# Patient Record
Sex: FEMALE | Race: WHITE | Employment: FULL TIME | URBAN - METROPOLITAN AREA
[De-identification: names, ages, dates, MRNs, and addresses within clinical notes are randomized per-mention and may not be internally consistent; named-entity substitution may affect disease eponyms.]

---

## 2018-08-21 PROBLEM — Z34.81 PRENATAL CARE, SUBSEQUENT PREGNANCY IN FIRST TRIMESTER: Status: ACTIVE | Noted: 2018-08-21

## 2018-08-21 PROBLEM — A63.0 VULVOVAGINAL CONDYLOMA: Status: ACTIVE | Noted: 2018-08-21

## 2018-08-21 PROCEDURE — 88142 CYTOPATH C/V THIN LAYER: CPT

## 2018-08-22 ENCOUNTER — HOSPITAL ENCOUNTER (OUTPATIENT)
Dept: LAB | Age: 28
Discharge: HOME | End: 2018-08-22

## 2018-10-15 PROBLEM — O26.899 PREGNANCY HEADACHE: Status: ACTIVE | Noted: 2018-10-15

## 2018-10-15 PROBLEM — R51.9 PREGNANCY HEADACHE: Status: ACTIVE | Noted: 2018-10-15

## 2019-02-18 ENCOUNTER — ANESTHESIA EVENT (OUTPATIENT)
Dept: LABOR AND DELIVERY | Age: 29
DRG: 560 | End: 2019-02-18
Payer: COMMERCIAL

## 2019-02-18 ENCOUNTER — ANESTHESIA (OUTPATIENT)
Dept: LABOR AND DELIVERY | Age: 29
DRG: 560 | End: 2019-02-18
Payer: COMMERCIAL

## 2019-02-18 ENCOUNTER — HOSPITAL ENCOUNTER (INPATIENT)
Age: 29
LOS: 2 days | Discharge: HOME | DRG: 560 | End: 2019-02-20
Attending: OBSTETRICS & GYNECOLOGY | Admitting: OBSTETRICS & GYNECOLOGY
Payer: COMMERCIAL

## 2019-02-18 PROBLEM — O42.90 DELAYED DELIVERY AFTER SROM (SPONTANEOUS RUPTURE OF MEMBRANES)ANTEPART: Status: RESOLVED | Noted: 2019-02-18 | Resolved: 2019-02-18

## 2019-02-18 PROBLEM — Z37.9 NORMAL LABOR: Status: ACTIVE | Noted: 2019-02-18

## 2019-02-18 PROBLEM — O42.90 AMNIOTIC FLUID LEAKING: Status: ACTIVE | Noted: 2019-02-18

## 2019-02-18 PROBLEM — O42.90 DELAYED DELIVERY AFTER SROM (SPONTANEOUS RUPTURE OF MEMBRANES)ANTEPART: Status: ACTIVE | Noted: 2019-02-18

## 2019-02-18 LAB
ABO + RH BLD: NORMAL
ARTERIAL PATENCY WRIST A: ABNORMAL
ARTERIAL PATENCY WRIST A: ABNORMAL
BASE DEFICIT BLD-SCNC: 3 MMOL/L
BASE DEFICIT BLD-SCNC: 4 MMOL/L
BDY SITE: ABNORMAL
BDY SITE: ABNORMAL
BLOOD GROUP ANTIBODIES SERPL: NORMAL
BODY TEMPERATURE: 98.6
BODY TEMPERATURE: 98.6
CO2 BLD-SCNC: 23 MMOL/L
CO2 BLD-SCNC: 24 MMOL/L
COLLECT TIME,HTIME: 2228
COLLECT TIME,HTIME: 2228
ERYTHROCYTE [DISTWIDTH] IN BLOOD BY AUTOMATED COUNT: 13.2 % (ref 11.9–14.6)
GAS FLOW.O2 O2 DELIVERY SYS: ABNORMAL L/MIN
GAS FLOW.O2 O2 DELIVERY SYS: ABNORMAL L/MIN
HCO3 BLD-SCNC: 21.8 MMOL/L (ref 22–26)
HCO3 BLDV-SCNC: 22.5 MMOL/L (ref 23–28)
HCT VFR BLD AUTO: 34 % (ref 35.8–46.3)
HGB BLD-MCNC: 11.3 G/DL (ref 11.7–15.4)
MCH RBC QN AUTO: 29.3 PG (ref 26.1–32.9)
MCHC RBC AUTO-ENTMCNC: 33.2 G/DL (ref 31.4–35)
MCV RBC AUTO: 88.1 FL (ref 79.6–97.8)
NRBC # BLD: 0 K/UL (ref 0–0.2)
PCO2 BLDCO: 39 MMHG (ref 32–68)
PCO2 BLDCO: 42 MMHG (ref 32–68)
PH BLDCO: 7.32 [PH] (ref 7.15–7.38)
PH BLDCO: 7.37 [PH] (ref 7.15–7.38)
PLATELET # BLD AUTO: 206 K/UL (ref 150–450)
PMV BLD AUTO: 9.7 FL (ref 9.4–12.3)
PO2 BLDCO: 28 MMHG
PO2 BLDCO: 31 MMHG
RBC # BLD AUTO: 3.86 M/UL (ref 4.05–5.2)
SAO2 % BLD: 47 % (ref 95–98)
SAO2 % BLDV: 57 % (ref 65–88)
SERVICE CMNT-IMP: ABNORMAL
SERVICE CMNT-IMP: ABNORMAL
SPECIMEN EXP DATE BLD: NORMAL
SPECIMEN TYPE: ABNORMAL
SPECIMEN TYPE: ABNORMAL
WBC # BLD AUTO: 7.4 K/UL (ref 4.3–11.1)

## 2019-02-18 PROCEDURE — 74011250636 HC RX REV CODE- 250/636

## 2019-02-18 PROCEDURE — 77030011943

## 2019-02-18 PROCEDURE — 74011250636 HC RX REV CODE- 250/636: Performed by: OBSTETRICS & GYNECOLOGY

## 2019-02-18 PROCEDURE — 77030020255 HC SOL INJ LR 1000ML BG

## 2019-02-18 PROCEDURE — A4300 CATH IMPL VASC ACCESS PORTAL: HCPCS | Performed by: ANESTHESIOLOGY

## 2019-02-18 PROCEDURE — 65270000029 HC RM PRIVATE

## 2019-02-18 PROCEDURE — 86900 BLOOD TYPING SEROLOGIC ABO: CPT

## 2019-02-18 PROCEDURE — 4A1HXCZ MONITORING OF PRODUCTS OF CONCEPTION, CARDIAC RATE, EXTERNAL APPROACH: ICD-10-PCS | Performed by: OBSTETRICS & GYNECOLOGY

## 2019-02-18 PROCEDURE — 82803 BLOOD GASES ANY COMBINATION: CPT

## 2019-02-18 PROCEDURE — 10907ZC DRAINAGE OF AMNIOTIC FLUID, THERAPEUTIC FROM PRODUCTS OF CONCEPTION, VIA NATURAL OR ARTIFICIAL OPENING: ICD-10-PCS | Performed by: OBSTETRICS & GYNECOLOGY

## 2019-02-18 PROCEDURE — 77030014125 HC TY EPDRL BBMI -B: Performed by: ANESTHESIOLOGY

## 2019-02-18 PROCEDURE — 85027 COMPLETE CBC AUTOMATED: CPT

## 2019-02-18 PROCEDURE — 77030020254 HC SOL INJ D5LR LACTATED RINGER

## 2019-02-18 RX ORDER — OXYTOCIN/RINGER'S LACTATE 30/500 ML
1-25 PLASTIC BAG, INJECTION (ML) INTRAVENOUS
Status: DISCONTINUED | OUTPATIENT
Start: 2019-02-18 | End: 2019-02-20 | Stop reason: ALTCHOICE

## 2019-02-18 RX ORDER — LIDOCAINE HYDROCHLORIDE 20 MG/ML
JELLY TOPICAL
Status: DISCONTINUED | OUTPATIENT
Start: 2019-02-18 | End: 2019-02-19 | Stop reason: HOSPADM

## 2019-02-18 RX ORDER — BUTORPHANOL TARTRATE 2 MG/ML
1 INJECTION INTRAMUSCULAR; INTRAVENOUS
Status: DISCONTINUED | OUTPATIENT
Start: 2019-02-18 | End: 2019-02-19 | Stop reason: HOSPADM

## 2019-02-18 RX ORDER — ROPIVACAINE HYDROCHLORIDE 2 MG/ML
INJECTION, SOLUTION EPIDURAL; INFILTRATION; PERINEURAL
Status: DISCONTINUED | OUTPATIENT
Start: 2019-02-18 | End: 2019-02-18 | Stop reason: HOSPADM

## 2019-02-18 RX ORDER — ROPIVACAINE HYDROCHLORIDE 2 MG/ML
INJECTION, SOLUTION EPIDURAL; INFILTRATION; PERINEURAL AS NEEDED
Status: DISCONTINUED | OUTPATIENT
Start: 2019-02-18 | End: 2019-02-18 | Stop reason: HOSPADM

## 2019-02-18 RX ORDER — DEXTROSE, SODIUM CHLORIDE, SODIUM LACTATE, POTASSIUM CHLORIDE, AND CALCIUM CHLORIDE 5; .6; .31; .03; .02 G/100ML; G/100ML; G/100ML; G/100ML; G/100ML
125 INJECTION, SOLUTION INTRAVENOUS CONTINUOUS
Status: DISCONTINUED | OUTPATIENT
Start: 2019-02-18 | End: 2019-02-20 | Stop reason: ALTCHOICE

## 2019-02-18 RX ORDER — FENTANYL CITRATE 50 UG/ML
INJECTION, SOLUTION INTRAMUSCULAR; INTRAVENOUS
Status: COMPLETED
Start: 2019-02-18 | End: 2019-02-18

## 2019-02-18 RX ORDER — LIDOCAINE HYDROCHLORIDE 10 MG/ML
1 INJECTION INFILTRATION; PERINEURAL
Status: DISCONTINUED | OUTPATIENT
Start: 2019-02-18 | End: 2019-02-19 | Stop reason: HOSPADM

## 2019-02-18 RX ORDER — SODIUM CHLORIDE 0.9 % (FLUSH) 0.9 %
5-40 SYRINGE (ML) INJECTION AS NEEDED
Status: DISCONTINUED | OUTPATIENT
Start: 2019-02-18 | End: 2019-02-20 | Stop reason: HOSPADM

## 2019-02-18 RX ORDER — MINERAL OIL
120 OIL (ML) ORAL
Status: DISCONTINUED | OUTPATIENT
Start: 2019-02-18 | End: 2019-02-19 | Stop reason: HOSPADM

## 2019-02-18 RX ORDER — SODIUM CHLORIDE 0.9 % (FLUSH) 0.9 %
5-40 SYRINGE (ML) INJECTION EVERY 8 HOURS
Status: DISCONTINUED | OUTPATIENT
Start: 2019-02-18 | End: 2019-02-20 | Stop reason: ALTCHOICE

## 2019-02-18 RX ORDER — FENTANYL CITRATE 50 UG/ML
INJECTION, SOLUTION INTRAMUSCULAR; INTRAVENOUS AS NEEDED
Status: DISCONTINUED | OUTPATIENT
Start: 2019-02-18 | End: 2019-02-18 | Stop reason: HOSPADM

## 2019-02-18 RX ADMIN — ROPIVACAINE HYDROCHLORIDE 10 ML/HR: 2 INJECTION, SOLUTION EPIDURAL; INFILTRATION; PERINEURAL at 15:06

## 2019-02-18 RX ADMIN — FENTANYL CITRATE 100 MCG: 50 INJECTION, SOLUTION INTRAMUSCULAR; INTRAVENOUS at 15:05

## 2019-02-18 RX ADMIN — SODIUM CHLORIDE, SODIUM LACTATE, POTASSIUM CHLORIDE, CALCIUM CHLORIDE, AND DEXTROSE MONOHYDRATE 125 ML/HR: 600; 310; 30; 20; 5 INJECTION, SOLUTION INTRAVENOUS at 11:48

## 2019-02-18 RX ADMIN — ROPIVACAINE HYDROCHLORIDE 8 ML: 2 INJECTION, SOLUTION EPIDURAL; INFILTRATION; PERINEURAL at 15:05

## 2019-02-18 RX ADMIN — SODIUM CHLORIDE, SODIUM LACTATE, POTASSIUM CHLORIDE, CALCIUM CHLORIDE, AND DEXTROSE MONOHYDRATE 125 ML/HR: 600; 310; 30; 20; 5 INJECTION, SOLUTION INTRAVENOUS at 20:13

## 2019-02-18 RX ADMIN — Medication 2 MILLI-UNITS/MIN: at 12:18

## 2019-02-18 RX ADMIN — SODIUM CHLORIDE, SODIUM LACTATE, POTASSIUM CHLORIDE, AND CALCIUM CHLORIDE 500 ML: 600; 310; 30; 20 INJECTION, SOLUTION INTRAVENOUS at 14:29

## 2019-02-18 NOTE — ANESTHESIA PREPROCEDURE EVALUATION
Anesthetic History Review of Systems / Medical History Patient summary reviewed and pertinent labs reviewed Pulmonary Within defined limits Neuro/Psych Within defined limits Cardiovascular Exercise tolerance: >4 METS 
  
GI/Hepatic/Renal 
Within defined limits Endo/Other Within defined limits Other Findings Comments: Term pregnancy Physical Exam 
 
Airway Mallampati: II 
TM Distance: 4 - 6 cm Neck ROM: normal range of motion Mouth opening: Normal 
 
 Cardiovascular Rhythm: regular Rate: normal 
 
 
 
 Dental 
No notable dental hx Pulmonary Breath sounds clear to auscultation Abdominal 
 
 
 
 Other Findings Anesthetic Plan ASA: 2 Anesthesia type: epidural 
 
 
Post-op pain plan if not by surgeon: epidural opioid Anesthetic plan and risks discussed with: Patient

## 2019-02-18 NOTE — PROGRESS NOTES
Admission Note Pt admitted to [address] [facility] [address] Admission assessment completed. Discussed plan of care with patient. IV started, Consents witnessed. Lab work drawn, sent to lab. Pt informed of need to track I&O for entire stay, instructed on use of specipan. Reviewed \"pain goal\" with patient, explaining realistic expectations for pain relief.

## 2019-02-18 NOTE — PROGRESS NOTES
02/18/19 1542 Cervical Exam  
Dilation (cm) 1.5 Vaginal exam done by? Dr Shana Rivera Membrane Status AROM  
SVE per Dr Shana Rivera.

## 2019-02-18 NOTE — PROGRESS NOTES
Dr. Rosa Hopkins at bedside. SVE per Dr. Rosa Hopkins 1-2 cm. AROM without difficulty per. Dr. Rosa Hopkins. Clear fluids noted.

## 2019-02-18 NOTE — ANESTHESIA PROCEDURE NOTES
Epidural Block Start time: 2/18/2019 2:52 PM 
End time: 2/18/2019 3:01 PM 
Performed by: Alexandra Snider MD 
Authorized by: Alexandra Snider MD  
 
Pre-Procedure Indication: at surgeon's request, procedure for pain and labor epidural   
Preanesthetic Checklist: patient identified, risks and benefits discussed, anesthesia consent, site marked, patient being monitored, timeout performed and anesthesia consent Timeout Time: 14:50 Epidural:  
Patient position:  Seated Prep region:  Lumbar Prep: Chlorhexidine Location:  L2-3 Needle and Epidural Catheter:  
Needle Type:  Tuohy Needle Gauge:  19 G Injection Technique:  Loss of resistance using air and loss of resistance using saline Attempts:  2 (at same space) Catheter Size:  20 G Catheter at Skin Depth (cm):  9 Depth in Epidural Space (cm):  5 Events: no blood with aspiration, no cerebrospinal fluid with aspiration, no paresthesia and negative aspiration test   
Test Dose:  Lidocaine 1.5% w/ epi and negative Assessment:  
Catheter Secured:  Tegaderm and tape Insertion:  Uncomplicated Patient tolerance:  Patient tolerated the procedure well with no immediate complications

## 2019-02-18 NOTE — PROGRESS NOTES
Labor Progress Note Patient seen, fetal heart rate and contraction pattern evaluated, patient examined. Patient Vitals for the past 1 hrs: 
 BP Pulse 02/18/19 1505 153/68 88  
02/18/19 1504 133/77 88  
02/18/19 1503 135/86 93  
02/18/19 1500 136/88 100 Physical Exam: 
Cervical Exam:   
1-2cm dilated 70% effaced Membranes:  Artificial Rupture of Membranes; Amniotic Fluid: medium amount of clear fluid Uterine Activity: Frequency: Every 3-4 minutes and Intensity: mild Fetal Heart Rate: Reactive Assessment/Plan: 
Reassuring fetal status, Continue plan for vaginal delivery

## 2019-02-18 NOTE — H&P
History & Physical 
 
Name: Kenya Mata MRN: [id number]  SSN: [id number] YOB: 1990  Age: 33 y.o. Sex: female Subjective:  
 
Estimated Date of Delivery: 19 OB History  Para Term  AB Living 2 1 1     1 SAB TAB Ectopic Molar Multiple Live Births 1  
  
# Outcome Date GA Lbr Marcos/2nd Weight Sex Delivery Anes PTL Lv  
2 Current 1 Term 14 40w6d  6 lb 11.6 oz (3.05 kg) M VAGINAL DELI EPIDURAL AN N JAMA Ms. Giuliano Hernandez is admitted with pregnancy at 39w0d for induction of labor due to spontaneous rupture of membranes. Prenatal course was normal.  Please see prenatal records for details. Past Medical History:  
Diagnosis Date  Abnormal Papanicolaou smear of cervix  Depression 2016  Known medical problems Ulcers  Vaginal delivery  Vaginal Pap smear with LGSIL   
 HPV effect, Mild dysplasia, candida present. GC / CHL neg. History reviewed. No pertinent surgical history. Social History Occupational History  Not on file Tobacco Use  Smoking status: Never Smoker  Smokeless tobacco: Never Used Substance and Sexual Activity  Alcohol use: Yes  Drug use: No  
 Sexual activity: Yes  
  Partners: Male Birth control/protection: None, Condom Family History Problem Relation Age of Onset  Thyroid Disease Mother  Hypertension Mother  Diabetes Father   
     borderline  Hypertension Father  Psychiatric Disorder Brother   
     possibly mild downs  Breast Cancer Maternal Aunt   
     great aunt  Heart Disease Maternal Grandmother   
     heart attack  Alcohol abuse Maternal Grandfather  Heart Disease Paternal Grandmother  Stroke Other On father side  Heart Disease Other On mother sided No Known Allergies Prior to Admission medications Medication Sig Start Date End Date Taking? Authorizing Provider prenatal multivitamin (PRENATAL RX) 60 mg iron-1 mg tablet Take 1 tab daily 8/7/13  Yes CÉSAR Herndon  
polyethylene glycol (MIRALAX) 17 gram/dose powder Take 17 g by mouth daily. 1/22/19   Siena Huang MD  
magnesium oxide (MAG-OX) 400 mg tablet Take 1 Tab by mouth two (2) times a day. 1/8/19   Siena Huang MD  
  
 
Review of Systems: Pertinent items are noted in the History of Present Illness. Objective:  
 
Vitals: 
Vitals:  
 02/18/19 1140 02/18/19 1141 02/18/19 1215 02/18/19 1413 BP: 123/77 123/77 Pulse: 94 94 Temp:   98.3 °F (36.8 °C) 99.1 °F (37.3 °C) Weight:      
Height:      
  
 
Physical Exam: 
Patient without distress. Heart: Regular rate and rhythm Lung: clear to auscultation throughout lung fields, no wheezes, no rales, no rhonchi and normal respiratory effort Back: costovertebral angle tenderness absent Abdomen: soft, nontender Fundus: soft and non tender Perineum: blood absent, amniotic fluid present Cervical Exam: 1 cm dilated Lower Extremities:  - Edema 1+ 
 - No evidence of DVT seen on physical exam. 
Membranes:  leaking Prenatal Labs:  
Lab Results Component Value Date/Time ABO/Rh(D) O POSITIVE 02/18/2019 11:36 AM  
 Rubella, External 5.48 08/23/2018 HBsAg, External Negative 08/23/2018 HIV, External N.R. 08/23/2018 RPR, External N.R. 08/23/2018 Gonorrhea, External Negative 08/21/2018 Chlamydia, External Negative 08/21/2018 ABO,Rh O+  Positive 08/23/2018 GrBStrep, External Negative 01/21/2019 Impression/Plan:  
 
Principal Problem: 
  Delayed delivery after SROM (spontaneous rupture of membranes)antepart (2/18/2019) Plan: Admit for induction of labor. Group B Strep negative. Signed By:  Alex Hobbs MD   
 February 18, 2019

## 2019-02-18 NOTE — PROGRESS NOTES
02/18/19 1700 Cervical Exam  
Dilation (cm) 3 Eff 70 % Station -2 ST cath and SVE done. Pt repositioned to R side for comfort

## 2019-02-19 PROCEDURE — 75410000002 HC LABOR FEE PER 1 HR

## 2019-02-19 PROCEDURE — 74011250637 HC RX REV CODE- 250/637: Performed by: OBSTETRICS & GYNECOLOGY

## 2019-02-19 PROCEDURE — 76060000078 HC EPIDURAL ANESTHESIA: Performed by: OBSTETRICS & GYNECOLOGY

## 2019-02-19 PROCEDURE — 77010026065 HC OXYGEN MINIMUM MEDICAL AIR

## 2019-02-19 PROCEDURE — 75410000000 HC DELIVERY VAGINAL/SINGLE

## 2019-02-19 PROCEDURE — 75410000003 HC RECOV DEL/VAG/CSECN EA 0.5 HR

## 2019-02-19 PROCEDURE — 65270000029 HC RM PRIVATE

## 2019-02-19 PROCEDURE — 74011250636 HC RX REV CODE- 250/636: Performed by: OBSTETRICS & GYNECOLOGY

## 2019-02-19 PROCEDURE — 77030018846 HC SOL IRR STRL H20 ICUM -A

## 2019-02-19 RX ORDER — SIMETHICONE 80 MG
80 TABLET,CHEWABLE ORAL
Status: DISCONTINUED | OUTPATIENT
Start: 2019-02-19 | End: 2019-02-20 | Stop reason: HOSPADM

## 2019-02-19 RX ORDER — POLYETHYLENE GLYCOL 3350 17 G/17G
17 POWDER, FOR SOLUTION ORAL DAILY
Status: DISCONTINUED | OUTPATIENT
Start: 2019-02-19 | End: 2019-02-19 | Stop reason: SDUPTHER

## 2019-02-19 RX ORDER — PRENATAL VIT 96/IRON FUM/FOLIC 27MG-0.8MG
1 TABLET ORAL DAILY
Status: DISCONTINUED | OUTPATIENT
Start: 2019-02-19 | End: 2019-02-20 | Stop reason: HOSPADM

## 2019-02-19 RX ORDER — POLYETHYLENE GLYCOL 3350 17 G/17G
17 POWDER, FOR SOLUTION ORAL DAILY
Status: DISCONTINUED | OUTPATIENT
Start: 2019-02-19 | End: 2019-02-20 | Stop reason: HOSPADM

## 2019-02-19 RX ORDER — KETOROLAC TROMETHAMINE 30 MG/ML
30 INJECTION, SOLUTION INTRAMUSCULAR; INTRAVENOUS
Status: COMPLETED | OUTPATIENT
Start: 2019-02-19 | End: 2019-02-19

## 2019-02-19 RX ORDER — IBUPROFEN 800 MG/1
800 TABLET ORAL EVERY 6 HOURS
Status: DISCONTINUED | OUTPATIENT
Start: 2019-02-19 | End: 2019-02-19

## 2019-02-19 RX ORDER — HYDROCODONE BITARTRATE AND ACETAMINOPHEN 5; 325 MG/1; MG/1
1 TABLET ORAL
Status: DISCONTINUED | OUTPATIENT
Start: 2019-02-19 | End: 2019-02-20 | Stop reason: HOSPADM

## 2019-02-19 RX ORDER — ACETAMINOPHEN 500 MG
1000 TABLET ORAL
Status: DISCONTINUED | OUTPATIENT
Start: 2019-02-19 | End: 2019-02-20 | Stop reason: HOSPADM

## 2019-02-19 RX ORDER — KETOROLAC TROMETHAMINE 10 MG/1
10 TABLET, FILM COATED ORAL EVERY 6 HOURS
Status: DISCONTINUED | OUTPATIENT
Start: 2019-02-19 | End: 2019-02-20 | Stop reason: HOSPADM

## 2019-02-19 RX ADMIN — IBUPROFEN 800 MG: 800 TABLET, FILM COATED ORAL at 02:09

## 2019-02-19 RX ADMIN — KETOROLAC TROMETHAMINE 10 MG: 10 TABLET, FILM COATED ORAL at 13:57

## 2019-02-19 RX ADMIN — ACETAMINOPHEN 1000 MG: 500 TABLET, FILM COATED ORAL at 12:53

## 2019-02-19 RX ADMIN — KETOROLAC TROMETHAMINE 30 MG: 30 INJECTION, SOLUTION INTRAMUSCULAR at 07:46

## 2019-02-19 RX ADMIN — WITCH HAZEL 1 PAD: 500 SOLUTION RECTAL; TOPICAL at 02:09

## 2019-02-19 RX ADMIN — POLYETHYLENE GLYCOL 3350 17 G: 17 POWDER, FOR SOLUTION ORAL at 07:51

## 2019-02-19 RX ADMIN — SIMETHICONE CHEW TAB 80 MG 80 MG: 80 TABLET ORAL at 02:09

## 2019-02-19 RX ADMIN — ACETAMINOPHEN 1000 MG: 500 TABLET, FILM COATED ORAL at 06:28

## 2019-02-19 RX ADMIN — KETOROLAC TROMETHAMINE 10 MG: 10 TABLET, FILM COATED ORAL at 21:54

## 2019-02-19 RX ADMIN — HYDROCODONE BITARTRATE AND ACETAMINOPHEN 1 TABLET: 5; 325 TABLET ORAL at 18:08

## 2019-02-19 RX ADMIN — HYDROCODONE BITARTRATE AND ACETAMINOPHEN 1 TABLET: 5; 325 TABLET ORAL at 14:15

## 2019-02-19 RX ADMIN — PRENATAL VIT W/ FE FUMARATE-FA TAB 27-0.8 MG 1 TABLET: 27-0.8 TAB at 07:51

## 2019-02-19 NOTE — L&D DELIVERY NOTE
Delivery Summary    Patient: Michelle Ramsey MRN: [id number]  SSN: [id number]    YOB: 1990  Age: 33 y.o. Sex: female       Information for the patient's :  Mirlande Ruiz [[id number]]       Labor Events:    Labor: No   Rupture Date: 2019   Rupture Time: 3:42 PM   Rupture Type AROM   Amniotic Fluid Volume: Moderate    Amniotic Fluid Description: Clear     Induction:         Augmentation: Oxytocin   Labor Events: Other (comment)     Cervical Ripening:           Delivery Events:  Episiotomy: None   Laceration(s): None     Repaired: None    Number of Repair Packets:     Suture Type and Size: None     Estimated Blood Loss (ml): 300ml       Delivery Date: 2019    Delivery Time: 10:28 PM  Delivery Type: Vaginal, Spontaneous  Sex:  Female     Gestational Age: 39w0d   Delivery Clinician:  Cas [facility]  Living Status: Living   Delivery Location: [address]D 424          APGARS  One minute Five minutes Ten minutes   Skin color: 0   1        Heart rate: 2   2        Grimace: 2   2        Muscle tone: 2   2        Breathin   2        Totals: 8   9            Presentation: Vertex    Position: Right Occiput Anterior  Resuscitation Method:  Suctioning-bulb; Tactile Stimulation     Meconium Stained: None      Cord Information: 3 Vessels  Complications: Other(Comment) (Comment)  Cord around: left lower extremity  Delayed cord clamping? Yes  Cord clamped date/time:   Disposition of Cord Blood: Lab    Blood Gases Sent?:      Placenta:  Date/Time: 2019 10:33 PM  Removal: Spontaneous      Appearance: Normal;Intact     Labolt Measurements:  Birth Weight: 6 lb 12.1 oz (3.065 kg)      Birth Length: 47 cm      Head Circumference: 34 cm      Chest Circumference: 31.5 cm     Abdominal Girth: Other Providers:   SIMRAN Ferrer;PRASANNA SAEZ;QUINTIN VIRK;[place], Obstetrician;Primary Nurse;Primary  Nurse; Anesthesiologist;Crna           Group B Strep:   Lab Results   Component Value Date/Time    GrBStrep, External Negative 2019     Information for the patient's :  Deidra Otero [[id number]]     Lab Results   Component Value Date/Time    ABO/Rh(D) O POSITIVE 2019 10:28 PM    ELY IgG NEG 2019 10:28 PM     Recent Labs     19  2317 19  2312   PCO2CB 39 42   PO2CB 31 28   HCO3I  --  21.8*   SO2I  --  47*   IBD 3 4   PTEMPI 98.6 98.6   SPECTI VENOUS CORD ARTERIAL CORD   PHICB 7.365 7.323   ISITE CORD CORD   IDEV ROOM AIR ROOM AIR   IALLEN NOT APPLICABLE NOT APPLICABLE

## 2019-02-19 NOTE — PROGRESS NOTES
Post-Partum Day Number 1 Progress Note Patient doing well post-partum. Voiding withour difficulty, normal lochia. Vitals:   
Patient Vitals for the past 8 hrs: 
 BP Temp Pulse Resp SpO2  
19 0700 100/70 98.4 °F (36.9 °C) 64 16 96 % 19 0309 93/46 98.3 °F (36.8 °C) 78 16 96 % 19 0209 108/56 98.1 °F (36.7 °C) 84 16 97 % 19 0103 117/56 98.3 °F (36.8 °C) 94   Temp (24hrs), Av.2 °F (36.8 °C), Min:97.3 °F (36.3 °C), Max:99.1 °F (37.3 °C) Vital signs stable, afebrile. Exam:  Patient without distress. Abdomen soft, fundus firm at level of umbilicus, nontender Perineum with normal lochia noted. Lower extremities are negative for swelling, cords or tenderness. Assessment and Plan:  Patient appears to be having uncomplicated post-partum course. Continue routine perineal care and maternal education. Plan discharge tomorrow if no problems occur.

## 2019-02-19 NOTE — PROGRESS NOTES
Pt given Norco 5 mg PO for cramping. Pt was tearful upon entering room. Pt in good spirits before RN left room. RN to reassess pain.

## 2019-02-19 NOTE — PROGRESS NOTES
Assessment complete. Assisted mother with breastfeeding. No latch achieved. Currently skin to skin. Instructed pt to call out with any needs or assistance. Pt verbalized understanding.

## 2019-02-19 NOTE — ANESTHESIA POSTPROCEDURE EVALUATION
* No procedures listed *. Anesthesia Post Evaluation Multimodal analgesia: multimodal analgesia not used between 6 hours prior to anesthesia start to PACU discharge Patient location during evaluation: PACU Patient participation: complete - patient participated Level of consciousness: awake and alert Pain management: adequate Airway patency: patent Anesthetic complications: no 
Cardiovascular status: acceptable and hemodynamically stable Respiratory status: acceptable Hydration status: acceptable Visit Vitals /70 (BP 1 Location: Left arm, BP Patient Position: Sitting) Pulse 64 Temp 36.9 °C (98.4 °F) Resp 16 Ht 5' 3\" (1.6 m) Wt 73 kg (161 lb) SpO2 96% Breastfeeding? Yes  
BMI 28.52 kg/m²

## 2019-02-19 NOTE — PROGRESS NOTES
SBAR OUT Report: Mother Verbal report given to [employer] RN (full name & credentials) on this patient, who is now being transferred to MI (unit) for routine progression of care. The patient is not wearing a green \"Anesthesia-Duramorph\" band. Report consisted of patient's Situation, Background, Assessment and Recommendations (SBAR).  ID bands were compared with the identification form, and verified with the patient and receiving nurse. Information from the SBAR and the [facility] [facility] Report was reviewed with the receiving nurse; opportunity for questions and clarification provided.

## 2019-02-19 NOTE — PROGRESS NOTES
RN into room to assess pt. Pt states she was able to have a bowel movement and states she feels \"so much better\". Encouraged walking around room and then resting. Mother verbalized understanding.

## 2019-02-19 NOTE — LACTATION NOTE

## 2019-02-19 NOTE — PROGRESS NOTES
Labor Progress Note Patient seen, fetal heart rate and contraction pattern evaluated, patient examined. Physical Exam: 
Cervical Exam:  3 cm dilated 70% effaced   
-2 station Uterine Activity: Frequency: Every 2 minutes and Intensity: moderate Fetal Heart Rate: Reactive Assessment/Plan: 
Reassuring fetal status, Continue plan for vaginal delivery

## 2019-02-19 NOTE — PROGRESS NOTES
SBAR IN Report: Mother Verbal report received from Parish Ruby RN on this patient, who is now being transferred from L&D for routine progression of care. The patient is not wearing a green \"Anesthesia-Duramorph\" band. Report consisted of patient's Situation, Background, Assessment and Recommendations (SBAR). [place] ID bands were compared with the identification form, and verified with the patient and transferring nurse. Information from the SBAR and the [place] Report was reviewed with the transferring nurse; opportunity for questions and clarification provided.

## 2019-02-19 NOTE — PROGRESS NOTES
Rn to room to reassess patient. Pt states that she is still having cramping and is somewhat tearful. Suggested to patient to try to have a bowel movement and then take a shower to help relax. Pt also states she is very exhausted. Educated pt that she needs to get rest after shower. Pt verbalized understanding. Pt on toilet trying to have bowel movement when RN left room. RN to reassess.

## 2019-02-20 VITALS
HEART RATE: 72 BPM | HEIGHT: 63 IN | SYSTOLIC BLOOD PRESSURE: 124 MMHG | TEMPERATURE: 97.9 F | DIASTOLIC BLOOD PRESSURE: 67 MMHG | BODY MASS INDEX: 28.53 KG/M2 | OXYGEN SATURATION: 98 % | WEIGHT: 161 LBS | RESPIRATION RATE: 18 BRPM

## 2019-02-20 PROCEDURE — 74011250636 HC RX REV CODE- 250/636: Performed by: OBSTETRICS & GYNECOLOGY

## 2019-02-20 PROCEDURE — 90715 TDAP VACCINE 7 YRS/> IM: CPT | Performed by: OBSTETRICS & GYNECOLOGY

## 2019-02-20 PROCEDURE — 74011250637 HC RX REV CODE- 250/637: Performed by: OBSTETRICS & GYNECOLOGY

## 2019-02-20 PROCEDURE — 90471 IMMUNIZATION ADMIN: CPT

## 2019-02-20 PROCEDURE — 90686 IIV4 VACC NO PRSV 0.5 ML IM: CPT | Performed by: OBSTETRICS & GYNECOLOGY

## 2019-02-20 RX ORDER — HYDROCODONE BITARTRATE AND ACETAMINOPHEN 5; 325 MG/1; MG/1
1 TABLET ORAL
Qty: 20 TAB | Refills: 0 | Status: SHIPPED | OUTPATIENT
Start: 2019-02-20 | End: 2021-01-11

## 2019-02-20 RX ORDER — NAPROXEN 375 MG/1
375 TABLET ORAL
Qty: 30 TAB | Refills: 1 | Status: SHIPPED | OUTPATIENT
Start: 2019-02-20 | End: 2021-01-11

## 2019-02-20 RX ADMIN — POLYETHYLENE GLYCOL 3350 17 G: 17 POWDER, FOR SOLUTION ORAL at 09:08

## 2019-02-20 RX ADMIN — HYDROCODONE BITARTRATE AND ACETAMINOPHEN 1 TABLET: 5; 325 TABLET ORAL at 06:25

## 2019-02-20 RX ADMIN — TETANUS TOXOID, REDUCED DIPHTHERIA TOXOID AND ACELLULAR PERTUSSIS VACCINE, ADSORBED 0.5 ML: 5; 2.5; 8; 8; 2.5 SUSPENSION INTRAMUSCULAR at 09:14

## 2019-02-20 RX ADMIN — KETOROLAC TROMETHAMINE 10 MG: 10 TABLET, FILM COATED ORAL at 03:40

## 2019-02-20 RX ADMIN — INFLUENZA VIRUS VACCINE 0.5 ML: 15; 15; 15; 15 SUSPENSION INTRAMUSCULAR at 09:14

## 2019-02-20 RX ADMIN — KETOROLAC TROMETHAMINE 10 MG: 10 TABLET, FILM COATED ORAL at 09:08

## 2019-02-20 RX ADMIN — HYDROCODONE BITARTRATE AND ACETAMINOPHEN 1 TABLET: 5; 325 TABLET ORAL at 00:20

## 2019-02-20 RX ADMIN — PRENATAL VIT W/ FE FUMARATE-FA TAB 27-0.8 MG 1 TABLET: 27-0.8 TAB at 09:08

## 2019-02-20 NOTE — PROGRESS NOTES
Report received from Ronald Gao RN, and care assumed. Bedside report completed. Pt denies any needs at present.

## 2019-02-20 NOTE — PROGRESS NOTES
SW consults received.  met with patient - patient agreeable for assessment to take place with her mother present. Patient became tearful when discussing history of depression/anxiety. She states that her pregnancy \"was tough because of the situation. \"  Upon further explanation, she states that FOB is \"currently deployed and doesn't want to be in the picture. \"  Explored patient's support system - she states that her parents and friends provide support/assistance. Patient has never sought medication or counseling for depression/anxiety. Patient states that she's unsure if she would like to pursue medication at this time, but she's open to the idea if needed. Patient states that she \"falls back on my sai because I know that God will take care of me. \"   
 
 provided education and literature on support available thru [employer] ([employer]). [employer] Warmline:  8-189-440-4PPD (0010). WWW. POSTPARTUM. NET Family was informed of signs/symptoms, forms of intervention (medication, counseling, education), and resources (local coordinators available telephonically, monthly support group in [place], weekly \"chat with expert\" phone sessions). Additionally, patient was provided with [facility] Checklist.\" Discussed importance of self-care and accepting help when offered. Family was encouraged to contact me with any questions/needs -  contact information provided. EPDS score = 7. No PCP - list of PCPs provided. [address]

## 2019-02-20 NOTE — DISCHARGE INSTRUCTIONS
Discharge instruction to follow: Activity: Pelvis rest for 6 weeks     No heavy lifting over 15 lbs for 2 weeks     No driving for 2 weeks     No push/pull motion such as sweeping or vacuuming for 2 weeks     No tub baths for 6 weeks    If using maryanne-bottle continue to use until comfortable stopping. Change sanitary pad after each urination or bowel movement. Call MD for the following:      Fever over 101 F; pain not relieved by medication; foul smelling vaginal discharge or an increase in vaginal bleeding. Take medication as prescribed. Follow up with MD as order. Patient Education        After Your Delivery (the Postpartum Period): Care Instructions  Your Care Instructions    Congratulations on the birth of your baby. Like pregnancy, the  period can be a time of excitement, acrol, and exhaustion. You may look at your wondrous little baby and feel happy. You may also be overwhelmed by your new sleep hours and new responsibilities. At first, babies often sleep during the days and are awake at night. They do not have a pattern or routine. They may make sudden gasps, jerk themselves awake, or look like they have crossed eyes. These are all normal, and they may even make you smile. In these first weeks after delivery, try to take good care of yourself. It may take 4 to 6 weeks to feel like yourself again, and possibly longer if you had a  birth. You will likely feel very tired for several weeks. Your days will be full of ups and downs, but lots of carol as well. Follow-up care is a key part of your treatment and safety. Be sure to make and go to all appointments, and call your doctor if you are having problems. It's also a good idea to know your test results and keep a list of the medicines you take. How can you care for yourself at home? Take care of your body after delivery  · Use pads instead of tampons for the bloody flow that may last as long as 2 weeks.   · Ease cramps with ibuprofen (Advil, Motrin). · Ease soreness of hemorrhoids and the area between your vagina and rectum with ice compresses or witch hazel pads. · Ease constipation by drinking lots of fluid and eating high-fiber foods. Ask your doctor about over-the-counter stool softeners. · Cleanse yourself with a gentle squeeze of warm water from a bottle instead of wiping with toilet paper. · Take a sitz bath in warm water several times a day. · Wear a good nursing bra. Ease sore and swollen breasts with warm, wet washcloths. · If you are not breastfeeding, use ice rather than heat for breast soreness. · Your period may not start for several months if you are breastfeeding. You may bleed more, and longer at first, than you did before you got pregnant. · Wait until you are healed (about 4 to 6 weeks) before you have sexual intercourse. Your doctor will tell you when it is okay to have sex. · Try not to travel with your baby for 5 or 6 weeks. If you take a long car trip, make frequent stops to walk around and stretch. Avoid exhaustion  · Rest every day. Try to nap when your baby naps. · Ask another adult to be with you for a few days after delivery. · Plan for  if you have other children. · Stay flexible so you can eat at odd hours and sleep when you need to. Both you and your baby are making new schedules. · Plan small trips to get out of the house. Change can make you feel less tired. · Ask for help with housework, cooking, and shopping. Remind yourself that your job is to care for your baby. Know about help for postpartum depression  · \"Baby blues\" are common for the first 1 to 2 weeks after birth. You may cry or feel sad or irritable for no reason. · Rest whenever you can. Being tired makes it harder to handle your emotions. · Go for walks with your baby. · Talk to your partner, friends, and family about your feelings.   · If your symptoms last for more than a few weeks, or if you feel very depressed, ask your doctor for help. · Postpartum depression can be treated. Support groups and counseling can help. Sometimes medicine can also help. Stay healthy  · Eat healthy foods so you have more energy and lose extra baby pounds. · If you breastfeed, avoid drugs. If you quit smoking during pregnancy, try to stay smoke-free. If you choose to have a drink now and then, have only one drink, and limit the number of occasions that you have a drink. Wait to breastfeed at least 2 hours after you have a drink to reduce the amount of alcohol the baby may get in the milk. · Start daily exercise after 4 to 6 weeks, but rest when you feel tired. · Learn exercises to tone your belly. Do Kegel exercises to regain strength in your pelvic muscles. You can do these exercises while you stand or sit. ? Squeeze the same muscles you would use to stop your urine. Your belly and thighs should not move. ? Hold the squeeze for 3 seconds, and then relax for 3 seconds. ? Start with 3 seconds. Then add 1 second each week until you are able to squeeze for 10 seconds. ? Repeat the exercise 10 to 15 times for each session. Do three or more sessions each day. · Find a class for new mothers and new babies that has an exercise time. · If you had a  birth, give yourself a bit more time before you exercise, and be careful. When should you call for help? Call 911 anytime you think you may need emergency care. For example, call if:    · You passed out (lost consciousness).    Call your doctor now or seek immediate medical care if:    · You have severe vaginal bleeding.  This means you are passing blood clots and soaking through a pad each hour for 2 or more hours.     · You are dizzy or lightheaded, or you feel like you may faint.     · You have a fever.     · You have new belly pain, or your pain gets worse.    Watch closely for changes in your health, and be sure to contact your doctor if:    · Your vaginal bleeding seems to be getting heavier.     · You have new or worse vaginal discharge.     · You feel sad, anxious, or hopeless for more than a few days.     · You do not get better as expected. Where can you learn more? Go to http://rj-enrico.info/. Enter A461 in the search box to learn more about \"After Your Delivery (the Postpartum Period): Care Instructions. \"  Current as of: September 5, 2018  Content Version: 11.9  © 1820-1771 [employer]. Care instructions adapted under license by [employer] (which disclaims liability or warranty for this information). If you have questions about a medical condition or this instruction, always ask your healthcare professional. [place] [address] any warranty or liability for your use of this information. DISCHARGE SUMMARY from Nurse    PATIENT INSTRUCTIONS:    After general anesthesia or intravenous sedation, for 24 hours or while taking prescription Narcotics:  · Limit your activities  · Do not drive and operate hazardous machinery  · Do not make important personal or business decisions  · Do  not drink alcoholic beverages  · If you have not urinated within 8 hours after discharge, please contact your surgeon on call. Report the following to your Doctor:  · Excessive pain, swelling, redness or odor of or around the vaginal area  · Temperature over 100.5  · Nausea and vomiting lasting longer than 4 hours or if unable to take medications  · Any signs of decreased circulation or nerve impairment to extremity: change in color, persistent  numbness, tingling, coldness or increase pain  · Any questions    What to do at Home:  Recommended activity: Activity as tolerated    *  Please give a list of your current medications to your Primary Care Provider. *  Please update this list whenever your medications are discontinued, doses are      changed, or new medications (including over-the-counter products) are added.     *  Please carry medication information at all times in case of emergency situations. These are general instructions for a healthy lifestyle:    No smoking/ No tobacco products/ Avoid exposure to second hand smoke  Surgeon General's Warning:  Quitting smoking now greatly reduces serious risk to your health. Obesity, smoking, and sedentary lifestyle greatly increases your risk for illness    A healthy diet, regular physical exercise & weight monitoring are important for maintaining a healthy lifestyle    You may be retaining fluid if you have a history of heart failure or if you experience any of the following symptoms:  Weight gain of 3 pounds or more overnight or 5 pounds in a week, increased swelling in our hands or feet or shortness of breath while lying flat in bed. Please call your doctor as soon as you notice any of these symptoms; do not wait until your next office visit. Recognize signs and symptoms of STROKE:    F-face looks uneven    A-arms unable to move or move unevenly    S-speech slurred or non-existent    T-time-call 911 as soon as signs and symptoms begin-DO NOT go       Back to bed or wait to see if you get better-TIME IS BRAIN. Warning Signs of HEART ATTACK     Call 911 if you have these symptoms:   Chest discomfort. Most heart attacks involve discomfort in the center of the chest that lasts more than a few minutes, or that goes away and comes back. It can feel like uncomfortable pressure, squeezing, fullness, or pain.  Discomfort in other areas of the upper body. Symptoms can include pain or discomfort in one or both arms, the back, neck, jaw, or stomach.  Shortness of breath with or without chest discomfort.  Other signs may include breaking out in a cold sweat, nausea, or lightheadedness. Don't wait more than five minutes to call 911 - MINUTES MATTER! Fast action can save your life. Calling 911 is almost always the fastest way to get lifesaving treatment.  Emergency Medical Services staff can begin treatment when they arrive -- up to an hour sooner than if someone gets to the hospital by car. The discharge information has been reviewed with the patient. The patient verbalized understanding. Discharge medications reviewed with the patient and appropriate educational materials and side effects teaching were provided.

## 2019-02-20 NOTE — LACTATION NOTE
In to see mom and infant for discharge. Mom states both nipples are very tender and she is also going to be doing some formula feeding. Reviewed nipple care to prevent infection and promote healing. Gave sample of lanolin per request. Mom plan on getting pump through insurance. Reviewed importance of consistent stimulation to breasts in order to make full milk supply. Encouraged her if not putting infant to breast to let nipples heal, to pump in place and feed back expressed colostrum. If going to just do breast and bottle, offer breast first and bottle second. Reviewed discharge information and how to manage period of engorgement. No further needs or questions at this time.

## 2019-02-20 NOTE — DISCHARGE SUMMARY
Obstetrical Discharge Summary     Name: Toña Downey MRN: [id number]  SSN: [id number]    YOB: 1990  Age: 33 y.o. Sex: female      Allergies: Patient has no known allergies. Admit Date: 2019    Discharge Date: 2019     Admitting Physician: Adela Myles MD     Attending Physician:  Nimo Parks MD     * Admission Diagnoses: Normal  (single liveborn) [Z38.2]; Normal labor [O80, Z37.9]    * Discharge Diagnoses:   Information for the patient's :  Torsten Lopez [[id number]]   Delivery of a 6 lb 12.1 oz (3.065 kg) female infant via Vaginal, Spontaneous on 2019 at 10:28 PM  by . Apgars were 8 and 9.        Additional Diagnoses:   Hospital Problems as of 2019 Date Reviewed: 2019          Codes Class Noted - Resolved POA    * (Principal)  (spontaneous vaginal delivery) ICD-10-CM: O80  ICD-9-CM: 650  2019 - Present No        RESOLVED: Delayed delivery after SROM (spontaneous rupture of membranes)antepart ICD-10-CM: O42.90  ICD-9-CM: 658.23  2019 - 2019 Yes             Lab Results   Component Value Date/Time    ABO/Rh(D) O POSITIVE 2019 11:36 AM    Rubella, External 5.48 2018    GrBStrep, External Negative 2019    ABO,Rh O+  Positive 2018      Immunization History   Administered Date(s) Administered    Influenza Vaccine 2015    Pneumococcal Vaccine (Unspecified Type) 2014    TB Skin Test (PPD) 2016    TB Skin Test (PPD) Intradermal 2015    Tdap 2014       * Procedures:   on 2019    Rand Drain  Depression Scale  I have been able to laugh and see the funny side of things: As much as I always could  I have looked forward with enjoyment to things: As much as I ever did  I have blamed myself unnecessarily when things went wrong: Not very often  I have been anxious or worried for no good reason: Hardly ever  I have felt scared or panicky for no very good reason: No, not much  Things have been getting on top of me: No, most of the time I have coped quite well  I have been so unhappy that I have had difficulty sleeping: Not very often  I have felt sad or miserable: Not very often  I have been so unhappy that I have been crying: Only occasionally  The thought of harming myself has occurred to me: Never  Total Score: 7    * Discharge Condition: good    [facility] Course: Normal hospital course following the delivery. * Disposition: Home    Discharge Medications:   Current Discharge Medication List      START taking these medications    Details   HYDROcodone-acetaminophen (NORCO) 5-325 mg per tablet Take 1 Tab by mouth every four (4) hours as needed. Max Daily Amount: 6 Tabs. Qty: 20 Tab, Refills: 0    Associated Diagnoses:  (spontaneous vaginal delivery)      naproxen (NAPROSYN) 375 mg tablet Take 1 Tab by mouth every six (6) hours as needed. Qty: 30 Tab, Refills: 1    Associated Diagnoses:  (spontaneous vaginal delivery)         CONTINUE these medications which have NOT CHANGED    Details   prenatal multivitamin (PRENATAL RX) 60 mg iron-1 mg tablet Take 1 tab daily  Qty: 30 Tab, Refills: 8      polyethylene glycol (MIRALAX) 17 gram/dose powder Take 17 g by mouth daily. Qty: 510 g, Refills: 3    Associated Diagnoses: Constipation during pregnancy in third trimester      magnesium oxide (MAG-OX) 400 mg tablet Take 1 Tab by mouth two (2) times a day. Qty: 60 Tab, Refills: 3    Associated Diagnoses: Leg cramps in pregnancy             * Follow-up Care/Patient Instructions:   Activity: Activity as tolerated, No sex for 6 weeks and No driving while on analgesics  Diet: Regular Diet      Follow-up Information     Follow up With Specialties Details Why Contact Info    Patti Salgado MD Obstetrics & Gynecology, Gynecology, Obstetrics On 3/21/2019 at 11:45 am [address] [place] [address] - [facility] [address]  [phone]             Signed By:  Sisi Vaughan MD     2019

## 2021-01-13 PROBLEM — Z34.81 PRENATAL CARE, SUBSEQUENT PREGNANCY IN FIRST TRIMESTER: Status: RESOLVED | Noted: 2018-08-21 | Resolved: 2021-01-13

## 2021-01-13 PROBLEM — R51.9 PREGNANCY HEADACHE: Status: RESOLVED | Noted: 2018-10-15 | Resolved: 2021-01-13

## 2021-01-13 PROBLEM — O42.90 AMNIOTIC FLUID LEAKING: Status: RESOLVED | Noted: 2019-02-18 | Resolved: 2021-01-13

## 2021-01-13 PROBLEM — O26.899 PREGNANCY HEADACHE: Status: RESOLVED | Noted: 2018-10-15 | Resolved: 2021-01-13

## 2021-09-28 PROBLEM — T19.2XXA RETAINED TAMPON: Status: ACTIVE | Noted: 2021-09-28

## 2021-09-28 PROBLEM — R10.2 PELVIC PAIN: Status: ACTIVE | Noted: 2021-09-28
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
## 2022-03-18 PROBLEM — T19.2XXA RETAINED TAMPON: Status: ACTIVE | Noted: 2021-09-28

## 2022-03-19 PROBLEM — R10.2 PELVIC PAIN: Status: ACTIVE | Noted: 2021-09-28

## 2022-03-20 PROBLEM — A63.0 VULVOVAGINAL CONDYLOMA: Status: ACTIVE | Noted: 2018-08-21

## 2022-08-24 NOTE — LACTATION NOTE
This note was copied from a baby's chart. Assisted with breastfeeding in in cross cradle on L and football on R.  Baby fed fairly well. Latched easily. Demonstrated manual lip flange. Encouraged frequent feeding and watch output. Mom is having severe cramping. Noted cramping occurred prior to latching and again 3 more episodes of cramping during the feeding. Noted [facility] Abraham has been in touch with Dr. Jessie Sánchez. Mom states she stooled at delivery. Since cramping was occurring prior to breastfeeding it does not appear to be triggered only by nursing. Encouraged mom to follow up with RN and MD about her pain level. Baby is nursing well. Reviewed first 24 hours and expectations for second day and beyond. 1 or 2

## 2023-04-25 ENCOUNTER — TELEPHONE (OUTPATIENT)
Dept: OBGYN CLINIC | Age: 33
End: 2023-04-25

## 2023-05-04 ENCOUNTER — OFFICE VISIT (OUTPATIENT)
Dept: OBGYN CLINIC | Age: 33
End: 2023-05-04
Payer: COMMERCIAL

## 2023-05-04 VITALS
WEIGHT: 125 LBS | HEIGHT: 64 IN | SYSTOLIC BLOOD PRESSURE: 102 MMHG | BODY MASS INDEX: 21.34 KG/M2 | DIASTOLIC BLOOD PRESSURE: 64 MMHG

## 2023-05-04 DIAGNOSIS — N90.89 VULVAL LESION: ICD-10-CM

## 2023-05-04 DIAGNOSIS — Z01.419 WELL WOMAN EXAM WITH ROUTINE GYNECOLOGICAL EXAM: Primary | ICD-10-CM

## 2023-05-04 DIAGNOSIS — Z12.4 SCREENING FOR CERVICAL CANCER: ICD-10-CM

## 2023-05-04 PROCEDURE — 99395 PREV VISIT EST AGE 18-39: CPT | Performed by: OBSTETRICS & GYNECOLOGY

## 2023-05-04 RX ORDER — LIDOCAINE AND PRILOCAINE 25; 25 MG/G; MG/G
CREAM TOPICAL
Qty: 5 G | Refills: 0 | Status: SHIPPED | OUTPATIENT
Start: 2023-05-04

## 2023-05-04 RX ORDER — DEXTROAMPHETAMINE SACCHARATE, AMPHETAMINE ASPARTATE, DEXTROAMPHETAMINE SULFATE AND AMPHETAMINE SULFATE 2.5; 2.5; 2.5; 2.5 MG/1; MG/1; MG/1; MG/1
10 TABLET ORAL 2 TIMES DAILY
COMMUNITY
Start: 2023-02-21

## 2023-05-04 ASSESSMENT — ENCOUNTER SYMPTOMS
EYES NEGATIVE: 1
COUGH: 0
ABDOMINAL PAIN: 0
SHORTNESS OF BREATH: 0
GASTROINTESTINAL NEGATIVE: 1
ALLERGIC/IMMUNOLOGIC NEGATIVE: 1
BLOOD IN STOOL: 0
RESPIRATORY NEGATIVE: 1

## 2023-05-08 ENCOUNTER — OFFICE VISIT (OUTPATIENT)
Dept: OBGYN CLINIC | Age: 33
End: 2023-05-08

## 2023-05-08 ENCOUNTER — HOSPITAL ENCOUNTER (EMERGENCY)
Age: 33
Discharge: HOME | End: 2023-05-09
Attending: EMERGENCY MEDICINE
Payer: COMMERCIAL

## 2023-05-08 VITALS — BODY MASS INDEX: 21.97 KG/M2 | DIASTOLIC BLOOD PRESSURE: 74 MMHG | WEIGHT: 128 LBS | SYSTOLIC BLOOD PRESSURE: 118 MMHG

## 2023-05-08 DIAGNOSIS — N94.89 LABIAL PAIN: Primary | ICD-10-CM

## 2023-05-08 DIAGNOSIS — N90.89 VULVAR MASS: Primary | ICD-10-CM

## 2023-05-08 PROCEDURE — 99283 EMERGENCY DEPT VISIT LOW MDM: CPT | Performed by: EMERGENCY MEDICINE

## 2023-05-08 ASSESSMENT — PATIENT HEALTH QUESTIONNAIRE - PHQ9
SUM OF ALL RESPONSES TO PHQ QUESTIONS 1-9: 0
2. FEELING DOWN, DEPRESSED OR HOPELESS: 0
SUM OF ALL RESPONSES TO PHQ9 QUESTIONS 1 & 2: 0
1. LITTLE INTEREST OR PLEASURE IN DOING THINGS: 0
SUM OF ALL RESPONSES TO PHQ QUESTIONS 1-9: 0

## 2023-05-08 ASSESSMENT — PAIN DESCRIPTION - LOCATION: LOCATION: VAGINA

## 2023-05-08 ASSESSMENT — LIFESTYLE VARIABLES
HOW OFTEN DO YOU HAVE A DRINK CONTAINING ALCOHOL: NEVER
HOW MANY STANDARD DRINKS CONTAINING ALCOHOL DO YOU HAVE ON A TYPICAL DAY: PATIENT DOES NOT DRINK

## 2023-05-08 ASSESSMENT — PAIN SCALES - GENERAL: PAINLEVEL_OUTOF10: 6

## 2023-05-08 ASSESSMENT — PAIN - FUNCTIONAL ASSESSMENT: PAIN_FUNCTIONAL_ASSESSMENT: 0-10

## 2023-05-09 VITALS
RESPIRATION RATE: 16 BRPM | OXYGEN SATURATION: 98 % | SYSTOLIC BLOOD PRESSURE: 102 MMHG | HEART RATE: 82 BPM | WEIGHT: 129 LBS | BODY MASS INDEX: 22.02 KG/M2 | HEIGHT: 64 IN | TEMPERATURE: 98.1 F | DIASTOLIC BLOOD PRESSURE: 68 MMHG

## 2023-05-09 RX ORDER — TRAMADOL HYDROCHLORIDE 50 MG/1
50 TABLET ORAL 3 TIMES DAILY PRN
Qty: 7 TABLET | Refills: 0 | Status: SHIPPED | OUTPATIENT
Start: 2023-05-09 | End: 2023-05-12

## 2023-05-09 NOTE — ED NOTES
I have reviewed discharge instructions with the patient. The patient verbalized understanding. Patient left ED via Discharge Method: ambulatory to Home with self. Opportunity for questions and clarification provided. Patient given 1 scripts. To continue your aftercare when you leave the hospital, you may receive an automated call from our care team to check in on how you are doing. This is a free service and part of our promise to provide the best care and service to meet your aftercare needs.  If you have questions, or wish to unsubscribe from this service please call [phone]. Thank you for Choosing our [facility] Emergency Department.        Serge Magana RN  05/09/23 3331

## 2023-05-09 NOTE — DISCHARGE INSTRUCTIONS
Continue to follow your gynecologist postprocedure instructions. You can leave the bandage we applied on until Tuesday evening. Please return with any fevers, redness, swelling, worsening symptoms, or additional concerns.

## 2023-05-09 NOTE — ED PROVIDER NOTES
Emergency Department Provider Note       PCP: Roseann Clemons MD   Age: 29 y.o. Sex: female     DISPOSITION Decision To Discharge 05/09/2023 12:01:29 AM       ICD-10-CM    1. Labial pain  N94.89           Medical Decision Making     We will bandage the area with some quick clot in case it starts to bleed again. I will discharge her home with a prescription for some pain medicine. Complexity of Problems Addressed:  Complexity of Problem: 1 acute, uncomplicated illness or injury. Data Reviewed and Analyzed:  Category 1:   I independently ordered and reviewed each unique test.  I reviewed external records: provider visit note from outside specialist.       Category 2:       Category 3: Discussion of management or test interpretation. Risk of Complications and/or Morbidity of Patient Management:      History     Rene Shelton is a 29 y.o. female who presents to the Emergency Department with chief complaint of    Chief Complaint   Patient presents with    Other     Pt had a procedure at [facility] today around 1600 on labia and has been bleeding since getting home. Pt caled OBGYN and was told to report to ER. Pt states pain is a 6 on a 0-10 pain scale during triage. 81-year-old lady presents with concerns about bleeding and pain in the area of her clitoral ziegler. She had a procedure by her gynecologist today and said that it remains very painful and she has had some persistent bleeding from it. She called on-call for her physician who advised her to get evaluated. She denies any fevers or chills. No other associated symptoms. Elements of this note were created using speech recognition software. As such, errors of speech recognition may be present. Physical Exam     Vitals signs and nursing note reviewed.    Vitals:    05/08/23 2230   BP: 99/61   Pulse: 78   Resp: 17   Temp: 98.1 °F (36.7 °C)   TempSrc: Oral   SpO2: 98%   Weight: 129 lb (58.5 kg)   Height: 5' 4\" (1.626 m)

## 2023-05-09 NOTE — ED TRIAGE NOTES
Pt had a procedure at [facility] today around 1600 on labia and has been bleeding since getting home[facility]. Pt states pain is a 6 on a 0-10 pain scale during triage.
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
## 2023-05-10 PROBLEM — N90.89 VULVAR MASS: Status: ACTIVE | Noted: 2018-08-21

## 2023-05-10 PROBLEM — T19.2XXA RETAINED TAMPON: Status: RESOLVED | Noted: 2021-09-28 | Resolved: 2023-05-10

## 2023-05-10 NOTE — PROGRESS NOTES
VULVAR/PERINEAL BIOPSY OR EXCISION    Date:  5/10/2023    Name:  Yajaira Currie     :  1990    Age:  29 y.o. Reason for procedure:  clitoral lesion        Patient was placed in the supine position. EMLA had been applied, this was wiped away. The area on the  upper  vulva was cleansed with betadine, then infiltrated with plain 1% xylocaine. Punch biopsy  was performed with 3mm punch. AgNO3 for hemostasis. The area  was not  sutured. Patient tolerated procedure well. Patient was given care instructions and told to call for severe swelling, significant bleeding, pain or fever. Will contact with results    Kayce Romeros was seen today for procedure. Diagnoses and all orders for this visit:    Vulvar mass  -     BIOPSY VULVA/PERINEUM,ONE LESN  -     [facility]; Future  -     [facility]    Other orders  -     [facility]         No follow-ups on file.      Trena Mosqueda MD

## 2023-05-16 LAB
CYTOLOGIST CVX/VAG CYTO: ABNORMAL
CYTOLOGY CVX/VAG DOC THIN PREP: ABNORMAL
HPV APTIMA: NEGATIVE
HPV GENOTYPE REFLEX: ABNORMAL
Lab: ABNORMAL
PATH REPORT.FINAL DX SPEC: ABNORMAL
PATHOLOGIST CVX/VAG CYTO: ABNORMAL
PATHOLOGIST PROVIDED ICD: ABNORMAL
RECOM F/U CVX/VAG CYTO: ABNORMAL
STAT OF ADQ CVX/VAG CYTO-IMP: ABNORMAL

## 2023-07-12 ENCOUNTER — OFFICE VISIT (OUTPATIENT)
Dept: OBGYN CLINIC | Age: 33
End: 2023-07-12

## 2023-07-12 VITALS
DIASTOLIC BLOOD PRESSURE: 77 MMHG | SYSTOLIC BLOOD PRESSURE: 124 MMHG | HEIGHT: 64 IN | WEIGHT: 126 LBS | BODY MASS INDEX: 21.51 KG/M2

## 2023-07-12 DIAGNOSIS — N89.8 VAGINAL DISCHARGE: Primary | ICD-10-CM

## 2023-07-12 DIAGNOSIS — N76.0 ACUTE VAGINITIS: ICD-10-CM

## 2023-07-12 LAB
BILIRUBIN, URINE, POC: NEGATIVE
BLOOD URINE, POC: NORMAL
GLUCOSE URINE, POC: NEGATIVE
HCG, PREGNANCY, URINE, POC: NEGATIVE
KETONES, URINE, POC: NEGATIVE
LEUKOCYTE ESTERASE, URINE, POC: NEGATIVE
NITRITE, URINE, POC: NEGATIVE
PH, URINE, POC: 5 (ref 4.6–8)
PROTEIN,URINE, POC: NEGATIVE
SPECIFIC GRAVITY, URINE, POC: 1.02 (ref 1–1.03)
URINALYSIS CLARITY, POC: CLEAR
URINALYSIS COLOR, POC: NORMAL
UROBILINOGEN, POC: NORMAL
VALID INTERNAL CONTROL, POC: YES

## 2023-07-12 RX ORDER — FLUCONAZOLE 150 MG/1
150 TABLET ORAL
Qty: 2 TABLET | Refills: 0 | Status: SHIPPED | OUTPATIENT
Start: 2023-07-12 | End: 2023-07-18

## 2023-07-12 NOTE — PROGRESS NOTES
CC:   Chief Complaint   Patient presents with    Vaginal Discharge       HPI:    Anibal Herrera  is a 29 y.o. , Dahlia Barr Patient's last menstrual period was 07/02/2023 (exact date). ,  who is seen for c/o vaginal discharge. The patient informs me that she was recently treated for UTI and finished course of abx approx 2 weeks ago. States she started noticing a \"white and chalky vaginal discharge\" and vaginal itching last week. Reports using OTC Monistat without relief. Denies abdominal pain, hematuria, vaginal odor, urinary frequency, urgency or dysuria today. Reports changing body wash a month ago. Denies laundry detergents recently. Contraception:  Phexxi-vaginal insert (uses prior to intercourse)    Menses:  Q  30 Days x 8 days, Moderate  Flow, No intermenstrual VB/spotting, No dysmenorrhea    Sexually active with male partner-no changes in partners within past year. Would like STD testing on Nuswab today. Denies post coital VB or dyspareunia. GYN HISTORY:  As per HPI     Hx STDs: +HPV (at age 22)    Last Pap: 5/4/23-LSIL/HPV Neg  Colpo 5/2023-benign with benign mole seen    Hx abnormal paps: Hx LSIL  colpo/bx 2022-Low grade squamos (JABIER 1)      Current Outpatient Medications on File Prior to Visit   Medication Sig Dispense Refill    Lactic Ac-Citric Ac-Pot Bitart (PHEXXI) 1.8-1-0.4 % GEL Place 1 insert vaginally as needed (immediately prior to intercourse) 5 g 3    amphetamine-dextroamphetamine (ADDERALL) 10 MG tablet Take 1 tablet by mouth 2 times daily. No current facility-administered medications on file prior to visit. Past Medical History:   Diagnosis Date    Abnormal Papanicolaou smear of cervix     Depression 8/11/2016    Known medical problems     Ulcers    Vaginal Pap smear with LGSIL     HPV effect, Mild dysplasia, candida present. GC / CHL neg. History reviewed. No pertinent surgical history.       Outpatient Encounter Medications as of 7/12/2023   Medication Sig

## 2023-07-15 LAB
A VAGINAE DNA VAG QL NAA+PROBE: NORMAL SCORE
BVAB2 DNA VAG QL NAA+PROBE: NORMAL SCORE
C ALBICANS DNA VAG QL NAA+PROBE: NEGATIVE
C GLABRATA DNA VAG QL NAA+PROBE: NEGATIVE
C TRACH RRNA SPEC QL NAA+PROBE: NEGATIVE
MEGA1 DNA VAG QL NAA+PROBE: NORMAL SCORE
N GONORRHOEA RRNA SPEC QL NAA+PROBE: NEGATIVE
SPECIMEN SOURCE: NORMAL
T VAGINALIS RRNA SPEC QL NAA+PROBE: NEGATIVE

## 2023-08-15 ENCOUNTER — HOSPITAL ENCOUNTER (EMERGENCY)
Age: 33
Discharge: HOME | End: 2023-08-15
Attending: EMERGENCY MEDICINE
Payer: COMMERCIAL

## 2023-08-15 VITALS
HEIGHT: 64 IN | OXYGEN SATURATION: 100 % | DIASTOLIC BLOOD PRESSURE: 76 MMHG | HEART RATE: 76 BPM | WEIGHT: 125 LBS | RESPIRATION RATE: 17 BRPM | SYSTOLIC BLOOD PRESSURE: 128 MMHG | TEMPERATURE: 98.6 F | BODY MASS INDEX: 21.34 KG/M2

## 2023-08-15 DIAGNOSIS — Z20.2 POSSIBLE EXPOSURE TO STD: Primary | ICD-10-CM

## 2023-08-15 LAB
APPEARANCE UR: CLEAR
BILIRUB UR QL: NEGATIVE
COLOR UR: NORMAL
GLUCOSE UR STRIP.AUTO-MCNC: NEGATIVE MG/DL
HCG UR QL: NEGATIVE
HGB UR QL STRIP: NEGATIVE
KETONES UR QL STRIP.AUTO: NEGATIVE MG/DL
LEUKOCYTE ESTERASE UR QL STRIP.AUTO: NEGATIVE
NITRITE UR QL STRIP.AUTO: NEGATIVE
PH UR STRIP: 7 (ref 5–9)
PROT UR STRIP-MCNC: NEGATIVE MG/DL
SERVICE CMNT-IMP: NORMAL
SP GR UR REFRACTOMETRY: 1.01 (ref 1–1.02)
UROBILINOGEN UR QL STRIP.AUTO: 0.2 EU/DL (ref 0.2–1)
WET PREP GENITAL: NORMAL

## 2023-08-15 PROCEDURE — 87210 SMEAR WET MOUNT SALINE/INK: CPT

## 2023-08-15 PROCEDURE — 87491 CHLMYD TRACH DNA AMP PROBE: CPT

## 2023-08-15 PROCEDURE — 96372 THER/PROPH/DIAG INJ SC/IM: CPT

## 2023-08-15 PROCEDURE — 2580000003 HC RX 258: Performed by: EMERGENCY MEDICINE

## 2023-08-15 PROCEDURE — 81025 URINE PREGNANCY TEST: CPT

## 2023-08-15 PROCEDURE — 6360000002 HC RX W HCPCS: Performed by: EMERGENCY MEDICINE

## 2023-08-15 PROCEDURE — 87591 N.GONORRHOEAE DNA AMP PROB: CPT

## 2023-08-15 PROCEDURE — 99284 EMERGENCY DEPT VISIT MOD MDM: CPT

## 2023-08-15 PROCEDURE — 81003 URINALYSIS AUTO W/O SCOPE: CPT

## 2023-08-15 RX ORDER — FLUCONAZOLE 150 MG/1
150 TABLET ORAL
Qty: 2 TABLET | Refills: 0 | Status: SHIPPED | OUTPATIENT
Start: 2023-08-15 | End: 2023-08-21

## 2023-08-15 RX ORDER — DOXYCYCLINE HYCLATE 100 MG
100 TABLET ORAL 2 TIMES DAILY
Qty: 14 TABLET | Refills: 0 | Status: SHIPPED | OUTPATIENT
Start: 2023-08-15 | End: 2023-08-22

## 2023-08-15 RX ADMIN — WATER 500 MG: 1 INJECTION INTRAMUSCULAR; INTRAVENOUS; SUBCUTANEOUS at 13:33

## 2023-08-15 ASSESSMENT — ENCOUNTER SYMPTOMS
BACK PAIN: 0
RESPIRATORY NEGATIVE: 1
ABDOMINAL PAIN: 1

## 2023-08-15 ASSESSMENT — PAIN SCALES - GENERAL
PAINLEVEL_OUTOF10: 0
PAINLEVEL_OUTOF10: 4

## 2023-08-15 ASSESSMENT — PAIN - FUNCTIONAL ASSESSMENT
PAIN_FUNCTIONAL_ASSESSMENT: 0-10
PAIN_FUNCTIONAL_ASSESSMENT: 0-10

## 2023-08-15 ASSESSMENT — PAIN DESCRIPTION - LOCATION: LOCATION: PELVIS;ABDOMEN

## 2023-08-15 NOTE — ED PROVIDER NOTES
Voice dictation software was used during the making of this note. This software is not perfect and grammatical and other typographical errors may be present. This note has not been completely proofread for errors.      Theferoz Trujillo MD  08/15/23 1007

## 2023-08-15 NOTE — DISCHARGE INSTRUCTIONS
Complete course antibiotic. Drink plenty of fluid. Take probiotic to decrease risk of diarrhea. We will call you in approximately 3-4 business days with results. You can also find it on my chart. Return if any concern.

## 2023-08-15 NOTE — ED TRIAGE NOTES
Pt ambulatory to triage for reports of pelvic pain & diarrhea x 3 days. Pt denies vaginal discharge but does report dark urine. Pt states she took plan B twice. Pt states she went to urgent care for STD testing after she found out partner cheated on her. Pt sent here for further evaluation due to mid centered abdominal tenderness. Pt states she didn't have STD testing done at urgent care & wants evaluation done here.

## 2023-08-17 LAB
C TRACH RRNA SPEC QL NAA+PROBE: NEGATIVE
N GONORRHOEA RRNA SPEC QL NAA+PROBE: NEGATIVE
SPECIMEN SOURCE: NORMAL

## 2023-10-06 NOTE — PROGRESS NOTES
VINEGAR PO Take by mouth    TURMERIC PO Take by mouth     No current facility-administered medications for this visit. Family history is significant for family history includes Alcohol Abuse in her maternal grandfather; Breast Cancer in her maternal aunt; Diabetes in her father; Heart Disease in her maternal grandmother, paternal grandmother, and another family member; Hypertension in her father and mother; Psychiatric Disorder in her brother; Stroke in her father and another family member; Thyroid Disease in her mother. Kristen Valles  reports that she has never smoked. She has never used smokeless tobacco. She reports current alcohol use. She reports that she does not use drugs. She completed her Systems Review which is documented below. Any positive systems not related to Gyn are recommended to discuss with her PCP. Review of Systems   Constitutional: Negative. Negative for unexpected weight change. HENT: Negative. Eyes: Negative. Respiratory: Negative. Negative for cough and shortness of breath. Cardiovascular: Negative. Negative for chest pain and palpitations. Gastrointestinal: Negative. Negative for abdominal pain and blood in stool. Endocrine: Negative. Genitourinary: Negative. Negative for difficulty urinating, dysuria, menstrual problem, pelvic pain and urgency. Musculoskeletal: Negative. Skin: Negative. Allergic/Immunologic: Negative. Neurological: Negative. Hematological: Negative. Psychiatric/Behavioral: Negative. Negative for behavioral problems and confusion. All other systems reviewed and are negative. No results found for this visit on 05/04/23. Blood pressure 102/64, height 5' 4\" (1.626 m), weight 125 lb (56.7 kg), last menstrual period 04/27/2023. Body mass index is 21.46 kg/m².    Wt Readings from Last 3 Encounters:   05/04/23 125 lb (56.7 kg)   04/13/22 141 lb (64 kg)   04/01/22 137 lb (62.1 kg)      Physical Exam  Vitals and Detail Level: Generalized

## 2024-07-02 ENCOUNTER — APPOINTMENT (OUTPATIENT)
Dept: GENERAL RADIOLOGY | Age: 34
End: 2024-07-02
Payer: OTHER MISCELLANEOUS

## 2024-07-02 ENCOUNTER — HOSPITAL ENCOUNTER (EMERGENCY)
Age: 34
Discharge: HOME | End: 2024-07-02
Payer: OTHER MISCELLANEOUS

## 2024-07-02 VITALS
WEIGHT: 130 LBS | TEMPERATURE: 98.6 F | HEIGHT: 64 IN | RESPIRATION RATE: 18 BRPM | DIASTOLIC BLOOD PRESSURE: 82 MMHG | SYSTOLIC BLOOD PRESSURE: 98 MMHG | HEART RATE: 69 BPM | OXYGEN SATURATION: 100 % | BODY MASS INDEX: 22.2 KG/M2

## 2024-07-02 DIAGNOSIS — S60.221A CONTUSION OF RIGHT HAND, INITIAL ENCOUNTER: Primary | ICD-10-CM

## 2024-07-02 DIAGNOSIS — M25.531 ACUTE PAIN OF RIGHT WRIST: ICD-10-CM

## 2024-07-02 PROCEDURE — 6370000000 HC RX 637 (ALT 250 FOR IP)

## 2024-07-02 PROCEDURE — 73130 X-RAY EXAM OF HAND: CPT

## 2024-07-02 PROCEDURE — 73110 X-RAY EXAM OF WRIST: CPT

## 2024-07-02 PROCEDURE — 99283 EMERGENCY DEPT VISIT LOW MDM: CPT

## 2024-07-02 RX ORDER — NAPROXEN 500 MG/1
500 TABLET ORAL 2 TIMES DAILY WITH MEALS
Qty: 10 TABLET | Refills: 0 | Status: SHIPPED | OUTPATIENT
Start: 2024-07-02 | End: 2024-07-07

## 2024-07-02 RX ORDER — TRAMADOL HYDROCHLORIDE 50 MG/1
50 TABLET ORAL
Status: COMPLETED | OUTPATIENT
Start: 2024-07-02 | End: 2024-07-02

## 2024-07-02 RX ADMIN — TRAMADOL HYDROCHLORIDE 50 MG: 50 TABLET ORAL at 13:11

## 2024-07-02 ASSESSMENT — ENCOUNTER SYMPTOMS
RESPIRATORY NEGATIVE: 1
EYES NEGATIVE: 1
ALLERGIC/IMMUNOLOGIC NEGATIVE: 1
GASTROINTESTINAL NEGATIVE: 1

## 2024-07-02 ASSESSMENT — PAIN SCALES - GENERAL: PAINLEVEL_OUTOF10: 6

## 2024-07-02 ASSESSMENT — PAIN - FUNCTIONAL ASSESSMENT: PAIN_FUNCTIONAL_ASSESSMENT: 0-10
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
## 2024-07-02 NOTE — DISCHARGE INSTRUCTIONS
As we discussed, your x-rays were negative for any acute fracture.  This gentleman with a short course of anti-inflammatory pain medication.  Please take with meals.  Please keep brace on during the day but she may take it off at night and rest with extremity slightly elevated.  I also placed a referral to the orthopedist if your pain is not improved over the next 2 to 4 weeks.  I do want you to follow-up with your primary care physician as soon as possible for recheck.  Please return to the emergency department for any new, worsening, concerning symptoms.

## 2024-07-02 NOTE — ED NOTES
Patient mobility status  with no difficulty. Provider aware     I have reviewed discharge instructions with the patient.  The patient verbalized understanding.    Patient left ED via Discharge Method: ambulatory to Home with  friend .    Opportunity for questions and clarification provided.     Patient given 1 scripts.